# Patient Record
Sex: FEMALE | Race: BLACK OR AFRICAN AMERICAN | NOT HISPANIC OR LATINO | URBAN - METROPOLITAN AREA
[De-identification: names, ages, dates, MRNs, and addresses within clinical notes are randomized per-mention and may not be internally consistent; named-entity substitution may affect disease eponyms.]

---

## 2019-02-13 ENCOUNTER — APPOINTMENT (RX ONLY)
Dept: URBAN - METROPOLITAN AREA CLINIC 95 | Facility: CLINIC | Age: 29
Setting detail: DERMATOLOGY
End: 2019-02-13

## 2019-02-13 DIAGNOSIS — D22 MELANOCYTIC NEVI: ICD-10-CM

## 2019-02-13 PROBLEM — L70.0 ACNE VULGARIS: Status: ACTIVE | Noted: 2019-02-13

## 2019-02-13 PROBLEM — D22.4 MELANOCYTIC NEVI OF SCALP AND NECK: Status: ACTIVE | Noted: 2019-02-13

## 2019-02-13 PROCEDURE — ? COUNSELING

## 2019-02-13 PROCEDURE — 99202 OFFICE O/P NEW SF 15 MIN: CPT

## 2019-02-13 ASSESSMENT — LOCATION ZONE DERM: LOCATION ZONE: SCALP

## 2019-02-13 ASSESSMENT — LOCATION DETAILED DESCRIPTION DERM: LOCATION DETAILED: MID-OCCIPITAL SCALP

## 2019-02-13 ASSESSMENT — LOCATION SIMPLE DESCRIPTION DERM: LOCATION SIMPLE: POSTERIOR SCALP

## 2019-02-13 NOTE — HPI: BUMPS
How Severe Are Your Bumps?: moderate
Have Your Bumps Been Treated?: not been treated
Is This A New Presentation, Or A Follow-Up?: Bump
Additional History: LOV 10/04/2011 [email or web address]